# Patient Record
Sex: MALE | Race: WHITE
[De-identification: names, ages, dates, MRNs, and addresses within clinical notes are randomized per-mention and may not be internally consistent; named-entity substitution may affect disease eponyms.]

---

## 2018-01-13 ENCOUNTER — HOSPITAL ENCOUNTER (EMERGENCY)
Dept: HOSPITAL 89 - ER | Age: 33
Discharge: HOME | End: 2018-01-13
Payer: COMMERCIAL

## 2018-01-13 VITALS — WEIGHT: 300 LBS | BODY MASS INDEX: 42 KG/M2 | HEIGHT: 71 IN

## 2018-01-13 VITALS — SYSTOLIC BLOOD PRESSURE: 117 MMHG | DIASTOLIC BLOOD PRESSURE: 74 MMHG

## 2018-01-13 DIAGNOSIS — R07.89: Primary | ICD-10-CM

## 2018-01-13 LAB — PLATELET COUNT, AUTOMATED: 301 K/UL (ref 150–450)

## 2018-01-13 PROCEDURE — 71046 X-RAY EXAM CHEST 2 VIEWS: CPT

## 2018-01-13 PROCEDURE — 99284 EMERGENCY DEPT VISIT MOD MDM: CPT

## 2018-01-13 PROCEDURE — 85379 FIBRIN DEGRADATION QUANT: CPT

## 2018-01-13 PROCEDURE — 82374 ASSAY BLOOD CARBON DIOXIDE: CPT

## 2018-01-13 PROCEDURE — 84520 ASSAY OF UREA NITROGEN: CPT

## 2018-01-13 PROCEDURE — 93005 ELECTROCARDIOGRAM TRACING: CPT

## 2018-01-13 PROCEDURE — 85025 COMPLETE CBC W/AUTO DIFF WBC: CPT

## 2018-01-13 PROCEDURE — 84450 TRANSFERASE (AST) (SGOT): CPT

## 2018-01-13 PROCEDURE — 84132 ASSAY OF SERUM POTASSIUM: CPT

## 2018-01-13 PROCEDURE — 84460 ALANINE AMINO (ALT) (SGPT): CPT

## 2018-01-13 PROCEDURE — 82435 ASSAY OF BLOOD CHLORIDE: CPT

## 2018-01-13 PROCEDURE — 82247 BILIRUBIN TOTAL: CPT

## 2018-01-13 PROCEDURE — 82947 ASSAY GLUCOSE BLOOD QUANT: CPT

## 2018-01-13 PROCEDURE — 84075 ASSAY ALKALINE PHOSPHATASE: CPT

## 2018-01-13 PROCEDURE — 82310 ASSAY OF CALCIUM: CPT

## 2018-01-13 PROCEDURE — 82040 ASSAY OF SERUM ALBUMIN: CPT

## 2018-01-13 PROCEDURE — 84484 ASSAY OF TROPONIN QUANT: CPT

## 2018-01-13 PROCEDURE — 84155 ASSAY OF PROTEIN SERUM: CPT

## 2018-01-13 PROCEDURE — 84295 ASSAY OF SERUM SODIUM: CPT

## 2018-01-13 PROCEDURE — 82565 ASSAY OF CREATININE: CPT

## 2018-01-13 NOTE — EKG
FACILITY: [facility] 

 

PATIENT NAME: JOZEF MENDOZA

: [id number]

MR: [id number]

V: [id number]

EXAM DATE: 

ORDERING PHYSICIAN: MARILEE KOEHLER

TECHNOLOGIST: MARA

 

Test Reason : CHEST PAIN

Blood Pressure : ***/*** mmHG

Vent. Rate : 095 BPM     Atrial Rate : 095 BPM

   P-R Int : 168 ms          QRS Dur : 096 ms

    QT Int : 386 ms       P-R-T Axes : 021 000 035 degrees

   QTc Int : 485 ms

 

Normal sinus rhythm

Cannot rule out Inferior infarct , age undetermined

Cannot rule out Anterior infarct , age undetermined

No ST-T abnormalities

No previous ECGs available

Confirmed by JAVI SOLIS ([id number]) on 2018 1:40:55 AM

 

Referred By:             Confirmed By:JAVI SOLIS

## 2018-01-13 NOTE — RADIOLOGY IMAGING REPORT
FACILITY: [facility] 

 

PATIENT NAME: Heraclio Collins

: 1985

MR: [id number]

V: [id number]

EXAM DATE: 

ORDERING PHYSICIAN: MARILEE KOEHLER

TECHNOLOGIST: 

 

[place] [facility] [place]

Patient: Heraclio Collins

: 1985

MRN: [id number]

Visit/Account:[id number]

Date of Sevice:  2018

 

ACCESSION #: [id number]

 

Examination: CHEST PA AND LAT

 

Comparison: None.

 

History: Chest Pain

 

Findings: Cardiac and hilar contour size is within normal limits. No consolidation, nodule, or peribr
onchial inflammation. No pneumothorax, edema, or effusion. Sternotomy wires are midline and intact. O
sseous structures are intact.

 

IMPRESSION:

 

No evidence of acute cardiopulmonary disease.

 

Report Dictated By: Husam Nevarez MD at 2018 7:54 PM

 

Report E-Signed By: Husam Nevarez MD  at 2018 7:55 PM

 

WSN:[id number]

## 2018-01-13 NOTE — ER REPORT
History and Physical


Time Seen By MD:  19:09


HPI/ROS


CHIEF COMPLAINT: Chest pain





HISTORY OF PRESENT ILLNESS: This is a 32-year-old male who presents to the 

emergency department with chest pain. Patient states that he is here [place] 

[employer] was out yesterday exerted himself quite a bit and then 

last night he developed some midsternal to right sided chest pain with some 

shortness of breath. Patient states that the pain and shortness of breath 

continued throughout today so he decided come in for further evaluation. 

Patient states that the pain increases when he is laying supine and decreases 

when he sitting up. Patient denies nausea, vomiting, diaphoresis, urinary 

symptoms, diarrhea, score chills. Patient denies visual changes or headaches.





REVIEW OF SYSTEMS:


Constitutional: No fever, no chills.


Eyes: No discharge.


ENT: No sore throat. 


Cardiovascular: As above.


Respiratory: As above.


Gastrointestinal: No abdominal pain, no vomiting.


Genitourinary: No hematuria.


Musculoskeletal: No back pain.


Skin: No rashes.


Neurological: No headache.


Allergies:  


Coded Allergies:  


     Penicillins (Verified  Allergy, Unknown, 18)


Home Meds


Reported Medications


Metoprolol Tartrate (METOPROLOL TARTRATE) 25 Mg Tablet, 25 MG PO QAM, TAB


   18


Past Medical/Surgical History


Patient has a past medical and surgical history of bicuspid valvular dysfunction

, thoracic aortic aneurysm with repair.


Reviewed Nurses Notes:  Yes


Constitutional





Vital Sign - Last 24 Hours








 18





 19:10 19:10 19:15 19:20


 


Temp  97.7  


 


Pulse 109 106 99 97


 


Resp 13 22 15 10


 


B/P (MAP) 152/93 (112) 152/93  127/85 (99)


 


Pulse Ox  91 94 94


 


O2 Delivery  Room Air  


 


    





 18





 19:25 19:40 19:43 19:45


 


Pulse 93 92  92


 


Resp 19 33  37


 


B/P (MAP)   112/74 (87) 


 


Pulse Ox 95 93  96





 18





 19:50 19:51 19:56 20:00


 


Pulse 96 91 90 


 


Resp 13 13 12 


 


B/P (MAP)    99/69 (79)


 


Pulse Ox 97 97 93 





 18





 20:01 20:06 20:11 20:16


 


Pulse 87 92 87 90


 


Resp 19 12 27 25


 


Pulse Ox 95 96 96 94





 18





 20:20 20:21 20:26 20:31


 


Pulse  92 89 92


 


Resp  17 12 14


 


B/P (MAP) 104/79 (87)   


 


Pulse Ox  95 95 94





 18





 20:36 20:40 20:41 20:46


 


Pulse 88  90 89


 


Resp 33  17 17


 


B/P (MAP)  125/77 (93)  


 


Pulse Ox 93  92 95





 18





 20:51 20:56 21:00 21:01


 


Pulse 88 86  87


 


Resp 18 20  26


 


B/P (MAP)   117/74 (88) 


 


Pulse Ox 95 96  95





 18 





 21:06 21:11 21:16 


 


Pulse 85 83 84 


 


Resp 13 34 16 


 


Pulse Ox 93 93 92 








Physical Exam


   General Appearance: The patient is alert, has no immediate need for airway 

protection and no signs of toxicity. 


Eyes: Pupils equal and round no pallor or injection.


ENT, Mouth: Mucous membranes are moist.


Respiratory: There are no retractions, lungs are clear to auscultation.


Cardiovascular: Regular rate and rhythm, systolic murmur, no clicks, or rubs.


Gastrointestinal:  Abdomen is soft and non tender, no masses, bowel sounds 

normal.


Neurological: Alert and oriented 4. Moving all extremities. Following all 

commands. No focal neuro deficits.


Skin: Warm and dry, no rashes.


Musculoskeletal:  Neck is supple non tender. 


      Extremities are nontender, nonswollen and have full range of motion. No 

calf tenderness or lower external edema.





DIFFERENTIAL DIAGNOSIS: After history and physical exam differential diagnosis 

was considered for chest pain including but not limited to myocardial ischemia, 

pericarditis pulmonary embolus, chest wall pain, pleural inflammation and 

pulmonary infectious causes.





Medical Decision Making


Data Points


Result Diagram:  


18





Laboratory





Hematology








Test


  18


19:10


 


Red Blood Count


  5.41 M/uL


(4.00-5.60)


 


Mean Corpuscular Volume


  86.6 fL


(80.0-96.0)


 


Mean Corpuscular Hemoglobin


  29.4 pg


(26.0-33.0)


 


Mean Corpuscular Hemoglobin


Concent 33.9 g/dL


(32.0-36.0)


 


Red Cell Distribution Width


  13.5 %


(11.5-14.5)


 


Mean Platelet Volume


  7.6 fL


(7.2-11.1)


 


Neutrophils (%) (Auto)


  82.8 %


(39.4-72.5)


 


Lymphocytes (%) (Auto)


  8.7 %


(17.6-49.6)


 


Monocytes (%) (Auto)


  6.1 %


(4.1-12.4)


 


Eosinophils (%) (Auto)


  0.6 %


(0.4-6.7)


 


Basophils (%) (Auto)


  1.8 %


(0.3-1.4)


 


Nucleated RBC Relative Count


(auto) 0.0 /100WBC 


 


 


Neutrophils # (Auto)


  11.9 K/uL


(2.0-7.4)


 


Lymphocytes # (Auto)


  1.3 K/uL


(1.3-3.6)


 


Monocytes # (Auto)


  0.9 K/uL


(0.3-1.0)


 


Eosinophils # (Auto)


  0.1 K/uL


(0.0-0.5)


 


Basophils # (Auto)


  0.3 K/uL


(0.0-0.1)


 


Nucleated RBC Absolute Count


(auto) 0.00 K/uL 


 


 


D-Dimer Quantitative (PE/DVT)


  0.38 ug/ml


(0-0.50)


 


Sodium Level


  139 mmol/L


(137-145)


 


Potassium Level


  3.5 mmol/L


(3.5-5.0)


 


Chloride Level


  103 mmol/L


()


 


Carbon Dioxide Level


  26 mmol/L


(22-30)


 


Blood Urea Nitrogen


  14 mg/dl


(9-21)


 


Creatinine


  1.10 mg/dl


(0.66-1.25)


 


Glomerular Filtration Rate


Calc > 60.0 


 


 


Random Glucose


  145 mg/dl


()


 


Calcium Level


  9.5 mg/dl


(8.4-10.2)


 


Total Bilirubin


  2.9 mg/dl


(0.2-1.3)


 


Aspartate Amino Transf


(AST/SGOT) 63 U/L (0-35) 


 


 


Alanine Aminotransferase


(ALT/SGPT) 56 U/L (0-56) 


 


 


Alkaline Phosphatase


  110 U/L


(0-126)


 


Troponin I 0.014 ng/ml 


 


Total Protein


  7.9 gm/dl


(6.3-8.2)


 


Albumin


  4.4 g/dl


(3.5-5.0)








Chemistry








Test


  18


19:10


 


White Blood Count


  14.4 k/uL


(4.5-11.0)


 


Red Blood Count


  5.41 M/uL


(4.00-5.60)


 


Hemoglobin


  15.9 g/dL


(14.0-18.0)


 


Hematocrit


  46.8 %


(42.0-52.0)


 


Mean Corpuscular Volume


  86.6 fL


(80.0-96.0)


 


Mean Corpuscular Hemoglobin


  29.4 pg


(26.0-33.0)


 


Mean Corpuscular Hemoglobin


Concent 33.9 g/dL


(32.0-36.0)


 


Red Cell Distribution Width


  13.5 %


(11.5-14.5)


 


Platelet Count


  301 K/uL


(150-450)


 


Mean Platelet Volume


  7.6 fL


(7.2-11.1)


 


Neutrophils (%) (Auto)


  82.8 %


(39.4-72.5)


 


Lymphocytes (%) (Auto)


  8.7 %


(17.6-49.6)


 


Monocytes (%) (Auto)


  6.1 %


(4.1-12.4)


 


Eosinophils (%) (Auto)


  0.6 %


(0.4-6.7)


 


Basophils (%) (Auto)


  1.8 %


(0.3-1.4)


 


Nucleated RBC Relative Count


(auto) 0.0 /100WBC 


 


 


Neutrophils # (Auto)


  11.9 K/uL


(2.0-7.4)


 


Lymphocytes # (Auto)


  1.3 K/uL


(1.3-3.6)


 


Monocytes # (Auto)


  0.9 K/uL


(0.3-1.0)


 


Eosinophils # (Auto)


  0.1 K/uL


(0.0-0.5)


 


Basophils # (Auto)


  0.3 K/uL


(0.0-0.1)


 


Nucleated RBC Absolute Count


(auto) 0.00 K/uL 


 


 


D-Dimer Quantitative (PE/DVT)


  0.38 ug/ml


(0-0.50)


 


Glomerular Filtration Rate


Calc > 60.0 


 


 


Calcium Level


  9.5 mg/dl


(8.4-10.2)


 


Total Bilirubin


  2.9 mg/dl


(0.2-1.3)


 


Aspartate Amino Transf


(AST/SGOT) 63 U/L (0-35) 


 


 


Alanine Aminotransferase


(ALT/SGPT) 56 U/L (0-56) 


 


 


Alkaline Phosphatase


  110 U/L


(0-126)


 


Troponin I 0.014 ng/ml 


 


Total Protein


  7.9 gm/dl


(6.3-8.2)


 


Albumin


  4.4 g/dl


(3.5-5.0)








Coagulation








Test


  18


19:10


 


D-Dimer Quantitative (PE/DVT) 0.38 ug/ml 











EKG/Imaging


EKG Interpretation


12 lead EKG:


      Rhythm: Normal sinus rhythm, 95 bpm.


      Axis: normal 


      QRS: normal


      ST segments: No ST depression or ST elevation.


No previous EKGs to compare to.


Imaging


Location: [facility]


Patient: Heraclio Collins


: 1985


MRN: [id number]


Visit/Account:[id number]


Date of Sevice:  2018


 


ACCESSION #: [id number]


 


Examination: CHEST PA AND LAT


 


Comparison: None.


 


History: Chest Pain


 


Findings: Cardiac and hilar contour size is within normal limits. No 

consolidation, nodule, or peribronchial inflammation. No pneumothorax, edema, 

or effusion. Sternotomy wires are midline and intact. Osseous structures are 

intact.


 


IMPRESSION:


 


No evidence of acute cardiopulmonary disease.


 


Report Dictated By: Husam Nevarez MD at 2018 7:54 PM


 


Report E-Signed By: Husam Nevarez MD  at 2018 7:55 PM


 


WSN:[id number]





ED Course/Re-evaluation


Clinical Indication for ER IV:  IV Access


ED Course


The patient was admitted to room. A history physical were obtained. 

Differential diagnoses were considered. IV was started. CBC, CMP, troponin, d-

dimer were obtained. Laboratory studies unremarkable with the exception of a 

WBC of 14.4 likely de-margination due to recent travel and stress. EKG 

unremarkable. Two-view chest x-ray showing no acute cardiopulmonary process. 

These results were reviewed with the patient, the patient was relieved with the 

test results. I did talk to the patient about the elevation as a partial cause 

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 I did 

recommend that he rest for the next 24 hours and he elected to stay in [facility] as opposed to returning [place]. Patient did state that he is 

feeling better now that he is at a lower elevation. The patient's had no other 

questions or concerns at this time and was discharged. Patient was also 

encouraged to return to the emergency department for any other concerns such as 

shortness of breath or increased chest pain.


Decision to Disposition Date:  2018


Decision to Disposition Time:  20:46





Depart


Departure


Latest Vital Signs





Vital Signs








  Date Time  Temp Pulse Resp B/P (MAP) Pulse Ox O2 Delivery O2 Flow Rate FiO2


 


18 21:16  84 16  92   


 


18 21:00    117/74 (88)    


 


18 19:10 97.7     Room Air  








Impression:  


 Primary Impression:  


 Chest pain


Condition:  Improved


Disposition:  HOME OR SELF-CARE


Patient Instructions:  Chest Pain (ED)





Additional Instructions:  


Drink plenty of water.


Get plenty of rest.


Consider staying in [facility] to avoid the higher elevations in [place].


You can go ahead and take ibuprofen or Tylenol as needed for pain.


Take it easy and rest over the next 24 hours.


If you have any increased chest pain or shortness of breath before you leave he 

may return to the emergency department for further evaluation.


When you get home follow-up with her primary care.





Problem Qualifiers








 Primary Impression:  


 Chest pain


 Chest pain type:  unspecified  Qualified Codes:  R07.9 - Chest pain, 

unspecified








MARILEE KOEHLER FNP-BC 2018 19:09